# Patient Record
Sex: FEMALE | Race: WHITE | URBAN - METROPOLITAN AREA
[De-identification: names, ages, dates, MRNs, and addresses within clinical notes are randomized per-mention and may not be internally consistent; named-entity substitution may affect disease eponyms.]

---

## 2020-06-04 ENCOUNTER — IMPORTED ENCOUNTER (OUTPATIENT)
Dept: URBAN - METROPOLITAN AREA CLINIC 1 | Facility: CLINIC | Age: 80
End: 2020-06-04

## 2020-06-04 PROBLEM — H26.491: Noted: 2020-06-04

## 2020-06-04 PROBLEM — H25.812: Noted: 2020-06-04

## 2020-06-04 PROBLEM — H16.143: Noted: 2020-06-04

## 2020-06-04 PROBLEM — H35.3231: Noted: 2020-06-04

## 2020-06-04 PROBLEM — Z96.1: Noted: 2020-06-04

## 2020-06-04 PROBLEM — H04.123: Noted: 2020-06-04

## 2020-06-04 PROCEDURE — 92015 DETERMINE REFRACTIVE STATE: CPT

## 2020-06-04 PROCEDURE — 92004 COMPRE OPH EXAM NEW PT 1/>: CPT

## 2020-06-04 NOTE — PATIENT DISCUSSION
1.  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 H/o injections OU. Followed by Benja Liz. 2.  Cataract OS: Visually Significant discussed the risks benefits alternatives and limitations of cataract surgery. The patient stated a full understanding and a desire to proceed with the procedure. The patient will need to return for preop appointment with cataract measurements and to have any additional questions answered and start pre-operative eye drops as directed. Phaco PCL OS Otherwise follow-up 6 months 10/DFE 3. MAXIMILIANO w/ PEK OU-The use/continuation of artificial tears were recommended. 4.  PCO  OD (Posterior Capsule Opacification)  Observe and consider yag cap when pt feels pco visually significant and visual acuity decreases to appropriate level. 5. Pseudophakia OD - (done in [place])Return for an appointment for Ascan/H and P. with Dr. Russell Sr. [phone]

## 2020-07-10 ENCOUNTER — IMPORTED ENCOUNTER (OUTPATIENT)
Dept: URBAN - METROPOLITAN AREA CLINIC 1 | Facility: CLINIC | Age: 80
End: 2020-07-10

## 2020-07-10 PROBLEM — H25.812: Noted: 2020-07-10

## 2020-07-10 PROCEDURE — 92136 OPHTHALMIC BIOMETRY: CPT

## 2020-07-10 NOTE — PATIENT DISCUSSION
1. Cataract OS:  Visually Significant discussed the risks benefits alternatives and limitations of cataract surgery. The patient stated a full understanding and a desire to proceed with the procedure. Discussed with patient if PO Gtts are more than $120 for all three combined when filling at their Pharmacy please call our office to request generic substitutions. Pt came to pre-op appointment today with alone Lifestyle Questionnaire Completed. Pt understands they will need glasses post-op to achieve their best corrected vision. **Guarded visual prognosis secondary to Wet ARMD. Not MF candidate.  Phaco PCL OS

## 2020-07-23 ENCOUNTER — IMPORTED ENCOUNTER (OUTPATIENT)
Dept: URBAN - METROPOLITAN AREA CLINIC 1 | Facility: CLINIC | Age: 80
End: 2020-07-23

## 2020-07-23 PROBLEM — Z96.1: Noted: 2020-07-23

## 2020-07-23 PROCEDURE — 99024 POSTOP FOLLOW-UP VISIT: CPT

## 2020-07-23 NOTE — PATIENT DISCUSSION
POD#1 CE/IOL OS (Standard) doing well. *Dextenza internalized Use Bromsite Qdaily OS Ocuflox TID OS : Use all gtts through completion of PO gtt chart regimen/ Per our instructions given to patient.   Post op Warnings Reiterated RTC as scheduled

## 2020-08-24 ENCOUNTER — IMPORTED ENCOUNTER (OUTPATIENT)
Dept: URBAN - METROPOLITAN AREA CLINIC 1 | Facility: CLINIC | Age: 80
End: 2020-08-24

## 2020-08-24 PROBLEM — Z96.1: Noted: 2020-08-24

## 2020-08-24 PROCEDURE — 99024 POSTOP FOLLOW-UP VISIT: CPT

## 2020-08-24 NOTE — PATIENT DISCUSSION
POM#1 CE/IOL OS (Standard) doing well. *Dextenza internalized  Use Bromsite Qdaily OS: Use gtts through completion of PO gtt regimen. MRX for glasses given. Return for an appointment in 6 months 27 with Dr. Elvin Giron.

## 2021-04-19 ENCOUNTER — IMPORTED ENCOUNTER (OUTPATIENT)
Dept: URBAN - METROPOLITAN AREA CLINIC 1 | Facility: CLINIC | Age: 81
End: 2021-04-19

## 2021-04-19 PROBLEM — H04.123: Noted: 2021-04-19

## 2021-04-19 PROBLEM — H16.143: Noted: 2021-04-19

## 2021-04-19 PROBLEM — H35.3231: Noted: 2021-04-19

## 2021-04-19 PROBLEM — Z96.1: Noted: 2021-04-19

## 2021-04-19 PROCEDURE — 92015 DETERMINE REFRACTIVE STATE: CPT

## 2021-04-19 PROCEDURE — 99214 OFFICE O/P EST MOD 30 MIN: CPT

## 2021-04-19 NOTE — PATIENT DISCUSSION
1.  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 H/o injections OU. Followed by Devyn Caruso (pt sees approximately ever 4-5 weeks). 2.  MAXIMILIANO w/ PEK OU- Recommend ATs QID OU routinely. Add Restasis w/o improvement OU. 3. Pseudophakia OU - (not PMG OD Standard OS) MRX for glasses given. Return for an appointment in 4 months 10/check K's with Dr. Alejandra Rankin.

## 2021-08-24 ENCOUNTER — IMPORTED ENCOUNTER (OUTPATIENT)
Dept: URBAN - METROPOLITAN AREA CLINIC 1 | Facility: CLINIC | Age: 81
End: 2021-08-24

## 2021-08-24 PROBLEM — H16.143: Noted: 2021-08-24

## 2021-08-24 PROBLEM — H04.123: Noted: 2021-08-24

## 2021-08-24 PROBLEM — Z96.1: Noted: 2021-08-24

## 2021-08-24 PROCEDURE — 99213 OFFICE O/P EST LOW 20 MIN: CPT

## 2021-08-24 NOTE — PATIENT DISCUSSION
1.  MAXIMILIANO w/ improved PEK OU- Recommend ATs QID-Q2H OU routinely 2. PCO OU - Observe 3. Pseudophakia OU - (not PMG OD Standard OS)4. H/o Wet ARMD OU - H/o injections OU. Followed by [facility] for an appointment in March 30 with Dr. [place] Pablo.

## 2022-04-02 ASSESSMENT — TONOMETRY
OD_IOP_MMHG: 14
OS_IOP_MMHG: 15
OS_IOP_MMHG: 10
OS_IOP_MMHG: 13
OS_IOP_MMHG: 12
OS_IOP_MMHG: 12
OD_IOP_MMHG: 12
OD_IOP_MMHG: 11

## 2022-04-02 ASSESSMENT — VISUAL ACUITY
OS_SC: 20/50
OS_SC: 20/200
OD_SC: 20/30
OS_SC: 20/40
OD_SC: 20/40-2
OS_GLARE: 20/400
OS_CC: 20/150
OD_CC: J5
OD_GLARE: 20/150
OS_CC: J10
OS_CC: 20/150
OS_CC: 20/400
OD_SC: 20/70

## 2024-07-19 ENCOUNTER — COMPREHENSIVE EXAM (OUTPATIENT)
Dept: URBAN - METROPOLITAN AREA CLINIC 1 | Facility: CLINIC | Age: 84
End: 2024-07-19

## 2024-07-19 DIAGNOSIS — H35.3232: ICD-10-CM

## 2024-07-19 DIAGNOSIS — Z96.1: ICD-10-CM

## 2024-07-19 DIAGNOSIS — H16.143: ICD-10-CM

## 2024-07-19 DIAGNOSIS — H04.123: ICD-10-CM

## 2024-07-19 DIAGNOSIS — H26.493: ICD-10-CM

## 2024-07-19 PROCEDURE — 99214 OFFICE O/P EST MOD 30 MIN: CPT

## 2024-07-19 PROCEDURE — 92015 DETERMINE REFRACTIVE STATE: CPT

## 2024-07-19 ASSESSMENT — VISUAL ACUITY
OD_CC: 20/40-1
OS_CC: 20/150

## 2024-07-19 ASSESSMENT — TONOMETRY
OS_IOP_MMHG: 13
OD_IOP_MMHG: 14

## 2025-07-21 ENCOUNTER — COMPREHENSIVE EXAM (OUTPATIENT)
Age: 85
End: 2025-07-21

## 2025-07-21 DIAGNOSIS — H26.493: ICD-10-CM

## 2025-07-21 DIAGNOSIS — H16.143: ICD-10-CM

## 2025-07-21 DIAGNOSIS — H52.223: ICD-10-CM

## 2025-07-21 DIAGNOSIS — H35.3232: ICD-10-CM

## 2025-07-21 DIAGNOSIS — H04.123: ICD-10-CM

## 2025-07-21 DIAGNOSIS — Z96.1: ICD-10-CM

## 2025-07-21 PROCEDURE — 99214 OFFICE O/P EST MOD 30 MIN: CPT

## 2025-07-21 PROCEDURE — 92015 DETERMINE REFRACTIVE STATE: CPT
